# Patient Record
Sex: FEMALE | Race: BLACK OR AFRICAN AMERICAN | NOT HISPANIC OR LATINO | URBAN - METROPOLITAN AREA
[De-identification: names, ages, dates, MRNs, and addresses within clinical notes are randomized per-mention and may not be internally consistent; named-entity substitution may affect disease eponyms.]

---

## 2017-05-22 ENCOUNTER — EMERGENCY (EMERGENCY)
Facility: HOSPITAL | Age: 42
LOS: 1 days | Discharge: HOME | End: 2017-05-22
Attending: EMERGENCY MEDICINE | Admitting: EMERGENCY MEDICINE
Payer: MEDICAID

## 2017-05-22 VITALS
TEMPERATURE: 98 F | RESPIRATION RATE: 16 BRPM | DIASTOLIC BLOOD PRESSURE: 82 MMHG | HEART RATE: 86 BPM | OXYGEN SATURATION: 98 % | SYSTOLIC BLOOD PRESSURE: 119 MMHG

## 2017-05-22 VITALS
HEART RATE: 96 BPM | WEIGHT: 136.03 LBS | DIASTOLIC BLOOD PRESSURE: 74 MMHG | RESPIRATION RATE: 18 BRPM | TEMPERATURE: 99 F | SYSTOLIC BLOOD PRESSURE: 118 MMHG | OXYGEN SATURATION: 97 %

## 2017-05-22 DIAGNOSIS — O02.1 MISSED ABORTION: ICD-10-CM

## 2017-05-22 DIAGNOSIS — O03.9 COMPLETE OR UNSPECIFIED SPONTANEOUS ABORTION WITHOUT COMPLICATION: ICD-10-CM

## 2017-05-22 DIAGNOSIS — R10.30 LOWER ABDOMINAL PAIN, UNSPECIFIED: ICD-10-CM

## 2017-05-22 LAB
ALBUMIN SERPL ELPH-MCNC: 4 G/DL — SIGNIFICANT CHANGE UP (ref 3.3–5)
ALP SERPL-CCNC: 60 U/L — SIGNIFICANT CHANGE UP (ref 40–120)
ALT FLD-CCNC: 12 U/L — SIGNIFICANT CHANGE UP (ref 10–45)
ANION GAP SERPL CALC-SCNC: 15 MMOL/L — SIGNIFICANT CHANGE UP (ref 5–17)
APPEARANCE UR: CLEAR — SIGNIFICANT CHANGE UP
AST SERPL-CCNC: 23 U/L — SIGNIFICANT CHANGE UP (ref 10–40)
BASOPHILS NFR BLD AUTO: 0.7 % — SIGNIFICANT CHANGE UP (ref 0–2)
BILIRUB SERPL-MCNC: 0.2 MG/DL — SIGNIFICANT CHANGE UP (ref 0.2–1.2)
BILIRUB UR-MCNC: NEGATIVE — SIGNIFICANT CHANGE UP
BLD GP AB SCN SERPL QL: NEGATIVE — SIGNIFICANT CHANGE UP
BUN SERPL-MCNC: 12 MG/DL — SIGNIFICANT CHANGE UP (ref 7–23)
CALCIUM SERPL-MCNC: 9.2 MG/DL — SIGNIFICANT CHANGE UP (ref 8.4–10.5)
CHLORIDE SERPL-SCNC: 101 MMOL/L — SIGNIFICANT CHANGE UP (ref 96–108)
CO2 SERPL-SCNC: 19 MMOL/L — LOW (ref 22–31)
COLOR SPEC: YELLOW — SIGNIFICANT CHANGE UP
CREAT SERPL-MCNC: 0.6 MG/DL — SIGNIFICANT CHANGE UP (ref 0.5–1.3)
DIFF PNL FLD: NEGATIVE — SIGNIFICANT CHANGE UP
EOSINOPHIL NFR BLD AUTO: 1.8 % — SIGNIFICANT CHANGE UP (ref 0–6)
GLUCOSE SERPL-MCNC: 87 MG/DL — SIGNIFICANT CHANGE UP (ref 70–99)
GLUCOSE UR QL: NEGATIVE — SIGNIFICANT CHANGE UP
HCG SERPL-ACNC: HIGH MIU/ML
HCT VFR BLD CALC: 37 % — SIGNIFICANT CHANGE UP (ref 34.5–45)
HGB BLD-MCNC: 12.9 G/DL — SIGNIFICANT CHANGE UP (ref 11.5–15.5)
HIV 1+2 AB+HIV1 P24 AG SERPL QL IA: SIGNIFICANT CHANGE UP
KETONES UR-MCNC: NEGATIVE — SIGNIFICANT CHANGE UP
LEUKOCYTE ESTERASE UR-ACNC: (no result)
LYMPHOCYTES # BLD AUTO: 47.9 % — HIGH (ref 13–44)
MCHC RBC-ENTMCNC: 32.3 PG — SIGNIFICANT CHANGE UP (ref 27–34)
MCHC RBC-ENTMCNC: 34.9 G/DL — SIGNIFICANT CHANGE UP (ref 32–36)
MCV RBC AUTO: 92.7 FL — SIGNIFICANT CHANGE UP (ref 80–100)
MONOCYTES NFR BLD AUTO: 10.1 % — SIGNIFICANT CHANGE UP (ref 2–14)
NEUTROPHILS NFR BLD AUTO: 39.5 % — LOW (ref 43–77)
NITRITE UR-MCNC: NEGATIVE — SIGNIFICANT CHANGE UP
PH UR: 6 — SIGNIFICANT CHANGE UP (ref 5–8)
PLATELET # BLD AUTO: 293 K/UL — SIGNIFICANT CHANGE UP (ref 150–400)
POTASSIUM SERPL-MCNC: 3.7 MMOL/L — SIGNIFICANT CHANGE UP (ref 3.5–5.3)
POTASSIUM SERPL-SCNC: 3.7 MMOL/L — SIGNIFICANT CHANGE UP (ref 3.5–5.3)
PROT SERPL-MCNC: 7.6 G/DL — SIGNIFICANT CHANGE UP (ref 6–8.3)
PROT UR-MCNC: NEGATIVE MG/DL — SIGNIFICANT CHANGE UP
RBC # BLD: 3.99 M/UL — SIGNIFICANT CHANGE UP (ref 3.8–5.2)
RBC # FLD: 13 % — SIGNIFICANT CHANGE UP (ref 10.3–16.9)
RH IG SCN BLD-IMP: POSITIVE — SIGNIFICANT CHANGE UP
SODIUM SERPL-SCNC: 135 MMOL/L — SIGNIFICANT CHANGE UP (ref 135–145)
SP GR SPEC: <=1.005 — SIGNIFICANT CHANGE UP (ref 1–1.03)
UROBILINOGEN FLD QL: 0.2 E.U./DL — SIGNIFICANT CHANGE UP
WBC # BLD: 4.3 K/UL — SIGNIFICANT CHANGE UP (ref 3.8–10.5)
WBC # FLD AUTO: 4.3 K/UL — SIGNIFICANT CHANGE UP (ref 3.8–10.5)

## 2017-05-22 PROCEDURE — 80053 COMPREHEN METABOLIC PANEL: CPT

## 2017-05-22 PROCEDURE — 76815 OB US LIMITED FETUS(S): CPT | Mod: 26

## 2017-05-22 PROCEDURE — 76817 TRANSVAGINAL US OBSTETRIC: CPT

## 2017-05-22 PROCEDURE — 99285 EMERGENCY DEPT VISIT HI MDM: CPT

## 2017-05-22 PROCEDURE — 86901 BLOOD TYPING SEROLOGIC RH(D): CPT

## 2017-05-22 PROCEDURE — 84702 CHORIONIC GONADOTROPIN TEST: CPT

## 2017-05-22 PROCEDURE — 76802 OB US < 14 WKS ADDL FETUS: CPT

## 2017-05-22 PROCEDURE — 87086 URINE CULTURE/COLONY COUNT: CPT

## 2017-05-22 PROCEDURE — 85025 COMPLETE CBC W/AUTO DIFF WBC: CPT

## 2017-05-22 PROCEDURE — 81001 URINALYSIS AUTO W/SCOPE: CPT

## 2017-05-22 PROCEDURE — 87389 HIV-1 AG W/HIV-1&-2 AB AG IA: CPT

## 2017-05-22 PROCEDURE — 86850 RBC ANTIBODY SCREEN: CPT

## 2017-05-22 PROCEDURE — 99284 EMERGENCY DEPT VISIT MOD MDM: CPT

## 2017-05-22 PROCEDURE — 36415 COLL VENOUS BLD VENIPUNCTURE: CPT

## 2017-05-22 PROCEDURE — 86900 BLOOD TYPING SEROLOGIC ABO: CPT

## 2017-05-22 PROCEDURE — 76817 TRANSVAGINAL US OBSTETRIC: CPT | Mod: 26

## 2017-05-22 RX ORDER — FLUCONAZOLE 150 MG/1
150 TABLET ORAL ONCE
Qty: 0 | Refills: 0 | Status: COMPLETED | OUTPATIENT
Start: 2017-05-22 | End: 2017-05-22

## 2017-05-22 RX ADMIN — FLUCONAZOLE 150 MILLIGRAM(S): 150 TABLET ORAL at 21:59

## 2017-05-22 NOTE — CONSULT NOTE ADULT - ATTENDING COMMENTS
Patient in stable condition and without complaints. Plan for follow up with primary GYN for medical or operative management.

## 2017-05-22 NOTE — ED PROVIDER NOTE - OBJECTIVE STATEMENT
The pt is a 42 y/o F, who presents to ED c/o pelvic cramping since last pm. , gravid at about 12 wks. Pt states pain is 4/10, feels "like period cramps", non radiating, no aggravating or alleviating factors, has not taken any pain meds. Denies vag bleeding, n/v/d, dysuria (just finished abx for uti), flank pain, fevers, chills

## 2017-05-22 NOTE — ED ADULT NURSE NOTE - OBJECTIVE STATEMENT
Pt who is  , LMP 2017  (unsure of the date) presents c/o cramping lower abdom pain starting this morning , denies any vag bleeding, spotting,  discharge, nausea, vomiting, fevers, chills. Reports pregnancy is unplanned, denies any contraceptives use. She states she had a normal US early in pregnancy, denies any hx of pre-eclampsia or placenta issues. States she just recently "got over a yeast infx and UTI". Pt declines analgesia at this time. IV access established, labs drawn, urine sent. Pending US. Pt aware of plan of care and agrees.

## 2017-05-22 NOTE — CONSULT NOTE ADULT - PROBLEM SELECTOR RECOMMENDATION 9
- discussed findings with patient  - options for management reviewed w/ patient including expectant vs. medical vs. surgical  - pt states she will f/u w/ outpatient GYN to decide on management  - pt stable for d/c, precautions reviewed  dw Dr. Blum

## 2017-05-22 NOTE — CONSULT NOTE ADULT - SUBJECTIVE AND OBJECTIVE BOX
40 yo  @ 12 wks per pt presents c/o lower abdominal cramping pain that started earlier today. She states pain is 4/10 and has not required medication for relief. She denies fever/chills, HA, dizziness, CP, palpitations, SOB, vaginal bleeding. She states she has occasional vaginal itching w/o accompanying abnormal discharge. She states she has not initiated prenatal care for this pregnancy yet and also states it is not desired.  OBHx:  x 3, 1 of which passed at 6 months (SIDS), VTOP X 4, most recent was D&C c/b ?retained products, did not require transfusion but pt states she had to return to hospital shortly after procedure  GYNHx: follows w/ GYN in [place], denies hx of abnormal pap smears, ovarian cysts, fibroids, STIs  MedHx: denies  SurgHx: D&C x 1  Allergy to PCN (facial swelling)  Vital Signs Last 24 Hrs  T(C): 36.7, Max: 37.1 ( @ 18:47)  T(F): 98, Max: 98.8 ( @ 18:47)  HR: 86 (81 - 96)  BP: 119/82 (108/71 - 119/82)  BP(mean): --  RR: 16 (16 - 18)  SpO2: 98% (97% - 98%)  GA: NAD, A+OX3  CV: RRR, no MRG  Pulm: CTAB  Abd: soft, mild tenderness on deep palpation of lower quadrants, no rebound or guarding  Spec: cervix appears closed, no vaginal bleeding, thick white discharge on lateral vaginal walls consistent w/ yeast                          12.9   4.3   )-----------( 293      ( 22 May 2017 19:34 )             37.0       135  |  101  |  12  ----------------------------<  87  3.7   |  19<L>  |  0.60    Ca    9.2      22 May 2017 19:34    TPro  7.6  /  Alb  4.0  /  TBili  0.2  /  DBili  x   /  AST  23  /  ALT  12  /  AlkPhos  60    Urinalysis Basic - ( 22 May 2017 20:21 )    Color: Yellow / Appearance: Clear / SG: <=1.005 / pH: x  Gluc: x / Ketone: NEGATIVE  / Bili: NEGATIVE / Urobili: 0.2 E.U./dL   Blood: x / Protein: NEGATIVE mg/dL / Nitrite: NEGATIVE   Leuk Esterase: Trace / RBC: < 5 /HPF / WBC < 5 /HPF   Sq Epi: x / Non Sq Epi: Few /HPF / Bacteria: Present /HPF    RH POSITIVE      EXAM:  US OB LES THAN 14WK EA ADD GES                          EXAM:  US OB TRANSVAGINAL                          PROCEDURE DATE:  2017                     INTERPRETATION:  OBSTETRICAL ULTRASOUND - FIRST TRIMESTER dated 2017   8:00 PM    INDICATION: First trimester pregnancy with pelvic cramping. LMP: 2017    TECHNIQUE: Transabdominal views of the pelvis were obtained followed by   transvaginal views for better visualization of the endometrial cavity.      PRIOR STUDIES: None    FINDINGS:   By dates, the estimated gestational age is 13 weeks 6 days.    A single intrauterine gestation is visible.    Mean sac diameter is 4.2 cm.  Crown-rump length is 10.0 mm, corresponding to gestational age of 7 weeks   1 day.    Fetal cardiac motion is not detected.     A yolk sac is not visible.     No subchorionic bleed is visible.  The cervix is closed with a length of   4.1 cm.    The uterus is anteverted. The uterus is 12.0 x 6.2 x 8.2 cm.  No   myometrial abnormalities are seen.     The right ovary is normal in size, measuring 2.4 x 1.3 x 2.0 cm. No right   ovarian masses are seen. The left ovary is normal in size, measuring 2.0   x 1.1 x 1.2 cm. No left ovarian masses are seen.     Doppler evaluation demonstrates flow to both ovaries with no evidence of   torsion. No significant pelvic free fluid is identified.      IMPRESSION:   Single intrauterine pregnancy with findings diagnostic of pregnancy   failure.                "Thank you for the opportunity to participate in the care of this   patient."    MIREILLE OTTO M.D., RADIOLOGY RESIDENT  This document has been electronically signed.  CIERRA VERGARA M.D., ATTENDING RADIOLOGIST  This document has been electronically signed. May 22 2017  9:05PM

## 2017-05-22 NOTE — ED PROVIDER NOTE - ATTENDING CONTRIBUTION TO CARE
pregnant with abd cramping.  us done showing missed .  seen by obgyn who recommended d and c but patient declining procedure at this time. to f/u with her own obgyn for further management.  hemodynamically stable

## 2017-05-22 NOTE — ED PROVIDER NOTE - MEDICAL DECISION MAKING DETAILS
, ~12 wks preg w/pelvic cramping, no vag bleeding, , ~12 wks preg w/pelvic cramping, no vag bleeding, labs and us revealed iup w/o FH and at 9 wks - consistent w/failed preg, gyn consulted, pt hemodynamically stable , ~12 wks preg w/pelvic cramping, no vag bleeding, labs wnl, rh+, but us revealed iup w/o FH and at 9 wks - consistent w/failed preg, gyn consulted, pt hemodynamically stable for d/c, understands and agrees w/follow up plan as per gyn , ~12 wks preg w/pelvic cramping, no vag bleeding, labs wnl, rh+, but us revealed iup w/o FH and at 9 wks - consistent w/failed preg, gyn consulted - wanting dose of diflucan and to be d/c'd w/outpatient f/u for suction d&c, pt hemodynamically stable for d/c, understands and agrees w/follow up plan as per gyn

## 2017-05-24 LAB
CULTURE RESULTS: SIGNIFICANT CHANGE UP
SPECIMEN SOURCE: SIGNIFICANT CHANGE UP
